# Patient Record
Sex: MALE | Race: WHITE | Employment: FULL TIME | URBAN - METROPOLITAN AREA
[De-identification: names, ages, dates, MRNs, and addresses within clinical notes are randomized per-mention and may not be internally consistent; named-entity substitution may affect disease eponyms.]

---

## 2022-09-06 ENCOUNTER — OFFICE VISIT (OUTPATIENT)
Dept: PODIATRY | Age: 62
End: 2022-09-06
Payer: COMMERCIAL

## 2022-09-06 VITALS — WEIGHT: 200 LBS | BODY MASS INDEX: 28.63 KG/M2 | HEIGHT: 70 IN

## 2022-09-06 DIAGNOSIS — M79.605 PAIN IN BOTH LOWER EXTREMITIES: ICD-10-CM

## 2022-09-06 DIAGNOSIS — L60.0 INGROWN NAIL OF GREAT TOE OF RIGHT FOOT: Primary | ICD-10-CM

## 2022-09-06 DIAGNOSIS — L60.0 INGROWN NAIL OF GREAT TOE OF LEFT FOOT: ICD-10-CM

## 2022-09-06 DIAGNOSIS — M79.604 PAIN IN BOTH LOWER EXTREMITIES: ICD-10-CM

## 2022-09-06 PROCEDURE — 11750 EXCISION NAIL&NAIL MATRIX: CPT | Performed by: PODIATRIST

## 2022-09-06 PROCEDURE — 3017F COLORECTAL CA SCREEN DOC REV: CPT | Performed by: PODIATRIST

## 2022-09-06 PROCEDURE — G8419 CALC BMI OUT NRM PARAM NOF/U: HCPCS | Performed by: PODIATRIST

## 2022-09-06 PROCEDURE — 99204 OFFICE O/P NEW MOD 45 MIN: CPT | Performed by: PODIATRIST

## 2022-09-06 PROCEDURE — 1036F TOBACCO NON-USER: CPT | Performed by: PODIATRIST

## 2022-09-06 PROCEDURE — G8427 DOCREV CUR MEDS BY ELIG CLIN: HCPCS | Performed by: PODIATRIST

## 2022-09-06 RX ORDER — OMEGA-3/DHA/EPA/FISH OIL 300-1000MG
CAPSULE ORAL
COMMUNITY

## 2022-09-06 RX ORDER — ASPIRIN 81 MG/1
81 TABLET ORAL DAILY
COMMUNITY

## 2022-09-06 RX ORDER — ATORVASTATIN CALCIUM 20 MG/1
20 TABLET, FILM COATED ORAL DAILY
COMMUNITY
Start: 2022-03-02

## 2022-09-06 RX ORDER — CYCLOBENZAPRINE HCL 5 MG
TABLET ORAL
COMMUNITY
Start: 2022-08-18

## 2022-09-06 RX ORDER — TURMERIC 400 MG
1000 CAPSULE ORAL
COMMUNITY

## 2022-09-06 NOTE — PROGRESS NOTES
[address]  Dept: [phone]    NEW PATIENT PROGRESS NOTE  Date of patient's visit: 9/6/2022  Patient's Name:  Kade Fry YOB: 1960            Patient Care Team:  RUKHSANA Becerra CNP as PCP - General (Nephrology)  Héctor Amaya DPM as Surgeon (Podiatry)        Chief Complaint   Patient presents with    New Patient     Establish care    Ingrown Toenail     Bl great toes x several years    Toe Pain     4th toe right foot         HPI:   Kade Fry is a 57 y.o. male who presents to the office today complaining of bilateral great toe ingrown toenails. Symptoms began several year(s) ago. Patient relates pain is present . Pain is rated 3 out of 10 and is described as intermittent. Treatments prior to today's visit include: removed several years ago. Currently denies F/C/N/V. Pt's primary care physician is RUKHSANA Becerra CNP last seen September 16 2021. Patient states he is also having pain with his 4th toe on the right foot. No Known Allergies    No past medical history on file. Prior to Admission medications    Medication Sig Start Date End Date Taking? Authorizing Provider   aspirin 81 MG EC tablet Take 81 mg by mouth daily   Yes Historical Provider, MD   atorvastatin (LIPITOR) 20 MG tablet Take 20 mg by mouth daily 3/2/22  Yes Historical Provider, MD   cyclobenzaprine (FLEXERIL) 5 MG tablet TAKE 1 TABLET(5 MG) BY MOUTH DAILY 8/18/22  Yes Historical Provider, MD   Multiple Vitamin (MULTIVITAMIN ADULT PO) Take 1 capsule by mouth daily   Yes Historical Provider, MD   Turmeric 400 MG CAPS Take 1,000 mg by mouth   Yes Historical Provider, MD   fish oil-omega-3 fatty acids 1000 MG capsule Take by mouth   Yes Historical Provider, MD       No past surgical history on file. No family history on file.     Social History     Tobacco Use    Smoking status: Never Smokeless tobacco: Never   Substance Use Topics    Alcohol use: Not on file       Review of Systems    Review of Systems:   History obtained from chart review and the patient  General ROS: negative for - chills, fatigue, fever, night sweats or weight gain  Constitutional: Negative for chills, diaphoresis, fatigue, fever and unexpected weight change. Musculoskeletal: Positive for arthralgias, gait problem and joint swelling. Neurological ROS: negative for - behavioral changes, confusion, headaches or seizures. Negative for weakness and numbness. Dermatological ROS: negative for - mole changes, rash  Cardiovascular: Negative for leg swelling. Gastrointestinal: Negative for constipation, diarrhea, nausea and vomiting. Lower Extremity Physical Examination:   Vitals: There were no vitals filed for this visit. General: AAO x 3 in NAD. Dermatologic Exam:  Daniel medial hallux nails are incurvated with edema    Musculoskeletal:     1st MPJ ROM decreased, Bilateral.  Muscle strength 5/5, Bilateral.  Pain present upon palpation of daniel hallux. Medial longitudinal arch, Bilateral WNL. Ankle ROM WNL,Bilateral.    Dorsally contracted digits absent digits 1-5 Bilateral.     Vascular: DP and PT pulses palpable 2/4, Bilateral.  CFT <3 seconds, Bilateral.  Hair growth present to the level of the digits, Bilateral.  Edema absent, Bilateral.  Varicosities absent, Bilateral. Erythema absent, Bilateral    Neurological: Sensation intact to light touch to level of digits, Bilateral.  Protective sensation intact 10/10 sites via 5.07/10g [place] Monofilament, Bilateral.  negative Tinel's, Bilateral.  negative Valleix sign, Bilateral.      Integument: Warm, dry, supple, Bilateral.  Open lesion absent, Bilateral.  Interdigital maceration absent to web spaces 1-4, Bilateral.  Fissures absent, Bilateral.       Asessment: Patient is a 57 y.o. male with:    Diagnosis Orders   1.  Ingrown nail of great toe of right foot  [place] REMOVAL OF NAIL BED      2. Ingrown nail of great toe of left foot  [place] REMOVAL OF NAIL BED      3. Pain in both lower extremities  [place] REMOVAL OF NAIL BED            Plan: Patient examined and evaluated. Current condition and treatment options discussed in detail. Discussed conservative and surgical options with the patient. Verbal consent obtained for chemical matrixectomy after all questions answered. Local anesthesia obtained via Hallux block to the Bilateral hallux utilizing 5 cc of 1% Lidocaine plain. Next the Bilateral hallux was prepped with Betadine. A digital tourniquet was applied. A freer elevator was used to free the medial nail border. An english anvil was used to remove the offending border in total.  A curette was used to ensure the offending border was free of any remaining nail. Next, three 30 second applications of 20% Phenol were applied to the offending nail border followed by an isopropyl alcohol flush. Triple antibiotic ointment was applied to the nail border followed by a semi-compressive dressing. The patient was instructed to leave this dressing clean/dry/intact until the following am at which point they will begin warm epsom salt soaks followed by application of antibiotic ointment and Band-Aid BID. Patient instructed to take Tylenol PRN pain. Post-operative chemical matrixectomy instruction sheet dispensed to patient. Verbal and written instructions given to patient. Contact office with any questions/problems/concerns. RTC in 1week(s).     9/6/2022    Electronically signed by Pedro Wlison DPM on 9/6/2022 at 3:36 PM  9/6/2022

## 2022-09-15 ENCOUNTER — OFFICE VISIT (OUTPATIENT)
Dept: PODIATRY | Age: 62
End: 2022-09-15

## 2022-09-15 VITALS — HEIGHT: 70 IN | BODY MASS INDEX: 28.63 KG/M2 | WEIGHT: 200 LBS

## 2022-09-15 DIAGNOSIS — Z98.890 POST-OPERATIVE STATE: Primary | ICD-10-CM

## 2022-09-15 PROCEDURE — 99024 POSTOP FOLLOW-UP VISIT: CPT | Performed by: PODIATRIST

## 2022-09-15 NOTE — PROGRESS NOTES
[address]  Dept: [phone]    POST-OP PROGRESS NOTE  Date of patient's visit: 9/15/2022  Patient's Name:  Jeremiah Dugan YOB: 1960            Patient Care Team:  RUKHSANA Almeida CNP as PCP - General (Nephrology)  Dawit Sutherland DPM as Surgeon (Podiatry)        Chief Complaint   Patient presents with    Post-Op Check     Post op x 1 week       Pt's primary care physician is RUKHSANA Almeida CNP last seen September 16 2021     Subjective: Jeremiah Dugan is a 57 y.o. male who presents to the office today 1week(s)  S/P chemical matrixectomy bl great toes for correction of ingrown toenails  Problem List Items Addressed This Visit    None  Visit Diagnoses       Post-operative state    -  Primary        . Patient relates pain is Absent  and IMPROVED. Pain is rated 0 out of 10 and is described as none. Currently denies F/C/N/V. Is patient taking pain medications as prescribed and is controlling pain no    Physical Examination:  Minimal bleeding post operatively. Edema present. No erythema. No Pus. Operative correction is satisfactory. Assessment: Jeremiah Dugan is status post chemical matrixectomy bilateral great toes  Normal post operative course. Doing well          ICD-10-CM    1. Post-operative state  Z98.890             Plan:  Patient examined and evaluated. Current condition and treatment options discussed in detail. Advised pt to soak once daily and monitor daily for infection. Verbal and written instructions given to patient. Orders: No orders of the defined types were placed in this encounter. Contact office with any questions/problems/concerns. RTC in 1month(s).      Electronically signed by Dawit Sutherland DPM on 9/15/2022 at 3:21 PM  9/15/2022

## 2024-02-08 ENCOUNTER — OFFICE VISIT (OUTPATIENT)
Dept: PODIATRY | Age: 64
End: 2024-02-08
Payer: COMMERCIAL

## 2024-02-08 VITALS — HEIGHT: 70 IN | BODY MASS INDEX: 28.63 KG/M2 | WEIGHT: 200 LBS

## 2024-02-08 DIAGNOSIS — M79.605 PAIN IN BOTH LOWER EXTREMITIES: ICD-10-CM

## 2024-02-08 DIAGNOSIS — L60.0 INGROWN NAIL OF GREAT TOE OF RIGHT FOOT: Primary | ICD-10-CM

## 2024-02-08 DIAGNOSIS — M79.604 PAIN IN BOTH LOWER EXTREMITIES: ICD-10-CM

## 2024-02-08 PROCEDURE — 11750 EXCISION NAIL&NAIL MATRIX: CPT | Performed by: PODIATRIST

## 2024-02-08 PROCEDURE — 99214 OFFICE O/P EST MOD 30 MIN: CPT | Performed by: PODIATRIST

## 2024-02-08 RX ORDER — FENOFIBRATE 48 MG/1
48 TABLET, COATED ORAL DAILY
COMMUNITY
Start: 2023-11-08

## 2024-02-08 NOTE — PROGRESS NOTES
the offending border was free of any remaining nail.  Next, three 30 second applications of 89% Phenol were applied to the offending nail border followed by an isopropyl alcohol flush.  Triple antibiotic ointment was applied to the nail border followed by a semi-compressive dressing.      The patient was instructed to leave this dressing clean/dry/intact until the following am at which point they will begin warm epsom salt soaks followed by application of antibiotic ointment and Band-Aid BID.  Patient instructed to take Tylenol PRN pain. Post-operative chemical matrixectomy instruction sheet dispensed to patient.             RTC in 1week(s).    2/8/2024

## 2024-02-15 ENCOUNTER — OFFICE VISIT (OUTPATIENT)
Dept: PODIATRY | Age: 64
End: 2024-02-15

## 2024-02-15 VITALS — BODY MASS INDEX: 28.63 KG/M2 | WEIGHT: 200 LBS | HEIGHT: 70 IN

## 2024-02-15 DIAGNOSIS — Z98.890 POST-OPERATIVE STATE: Primary | ICD-10-CM

## 2024-02-15 PROCEDURE — 99024 POSTOP FOLLOW-UP VISIT: CPT | Performed by: PODIATRIST

## 2024-02-15 NOTE — PROGRESS NOTES
[facility]  [address] [place], [place] [id number]  Dept: [phone]    POST-OP PROGRESS NOTE  Date of patient's visit: 2/15/2024  Patient's Name:  Herman Matias YOB: 1960            Patient Care Team:  Julieta Nguyen, APRN - CNP as PCP - General (Nephrology)  Lolly Aggarwal DPM as Surgeon (Podiatry)        Chief Complaint   Patient presents with    Post-Op Check     Post op x 1 week       Pt's primary care physician is Rodolfo Flores last seen August 25 2023     Subjective: Herman Matias is a 63 y.o. male who presents to the office today 1week(s)  S/P chemical matrixectomy right great toe for correction of ingrown toenail  Problem List Items Addressed This Visit    None  Visit Diagnoses       Post-operative state    -  Primary        . Patient relates pain is Absent  and IMPROVED.  Pain is rated 0 out of 10 and is described as none. Currently denies F/C/N/V.  Is patient taking pain medications as prescribed and is controlling pain no    Physical Examination:   Minimal bleeding post operatively. Edema present. No erythema. No Pus.   Operative correction is satisfactory.      Assessment: Herman Matias is status post chemical matrixectomy right great toe  Normal post operative course.  Doing well          ICD-10-CM    1. Post-operative state  Z98.890             Plan:  Patient examined and evaluated.  Current condition and treatment options discussed in detail.  Advised pt to monitor daily for infection.  Verbal and written instructions given to patient.  Orders: No orders of the defined types were placed in this encounter.     Contact office with any questions/problems/concerns.  RTC in 2month(s).     Electronically signed by Lolly Aggarwal DPM on 2/15/2024 at 2:48 PM  2/15/2024

## 2024-11-07 ENCOUNTER — OFFICE VISIT (OUTPATIENT)
Dept: PODIATRY | Age: 64
End: 2024-11-07

## 2024-11-07 VITALS — HEIGHT: 70 IN | WEIGHT: 200 LBS | BODY MASS INDEX: 28.63 KG/M2

## 2024-11-07 DIAGNOSIS — R60.0 EDEMA OF LOWER EXTREMITY: ICD-10-CM

## 2024-11-07 DIAGNOSIS — M77.51 BURSITIS OF INTERMETATARSAL BURSA OF RIGHT FOOT: ICD-10-CM

## 2024-11-07 DIAGNOSIS — M79.605 PAIN IN BOTH LOWER EXTREMITIES: Primary | ICD-10-CM

## 2024-11-07 DIAGNOSIS — M77.51 CAPSULITIS OF METATARSOPHALANGEAL (MTP) JOINT OF RIGHT FOOT: ICD-10-CM

## 2024-11-07 DIAGNOSIS — M79.604 PAIN IN BOTH LOWER EXTREMITIES: Primary | ICD-10-CM

## 2024-11-07 RX ORDER — CLOBETASOL PROPIONATE 0.5 MG/G
OINTMENT TOPICAL
Qty: 30 G | Refills: 1 | Status: SHIPPED | OUTPATIENT
Start: 2024-11-07

## 2024-11-13 RX ORDER — BETAMETHASONE SODIUM PHOSPHATE AND BETAMETHASONE ACETATE 3; 3 MG/ML; MG/ML
6 INJECTION, SUSPENSION INTRA-ARTICULAR; INTRALESIONAL; INTRAMUSCULAR; SOFT TISSUE ONCE
Status: COMPLETED | OUTPATIENT
Start: 2024-11-13 | End: 2024-11-15

## 2024-11-13 NOTE — PROGRESS NOTES
minutes was spent with this patients encounter which included charting after the patients visit    .  Verbal and written instructions given to patient. Contact office with any questions/problems/concerns.     Orders Placed This Encounter   Procedures    XR FOOT RIGHT (MIN 3 VIEWS)     Order Specific Question:   Reason for exam:     Answer:   right 4th toe pain     Orders Placed This Encounter   Medications    clobetasol (TEMOVATE) 0.05 % ointment     Sig: Apply topically 2 times daily.     Dispense:  30 g     Refill:  1        RTC in 1month(s).    11/7/2024      Electronically signed by Lolly Aggarwal DPM on 11/13/2024 at 11:46 AM  11/7/2024

## 2024-11-15 RX ADMIN — BETAMETHASONE SODIUM PHOSPHATE AND BETAMETHASONE ACETATE 6 MG: 3; 3 INJECTION, SUSPENSION INTRA-ARTICULAR; INTRALESIONAL; INTRAMUSCULAR; SOFT TISSUE at 09:23
